# Patient Record
Sex: MALE | Race: WHITE | Employment: UNEMPLOYED | URBAN - METROPOLITAN AREA
[De-identification: names, ages, dates, MRNs, and addresses within clinical notes are randomized per-mention and may not be internally consistent; named-entity substitution may affect disease eponyms.]

---

## 2017-02-13 DIAGNOSIS — M06.9 RHEUMATOID ARTHRITIS INVOLVING MULTIPLE SITES, UNSPECIFIED RHEUMATOID FACTOR PRESENCE: ICD-10-CM

## 2017-02-13 RX ORDER — LEFLUNOMIDE 20 MG/1
20 TABLET ORAL DAILY
Qty: 30 TABLET | Refills: 0 | Status: SHIPPED | OUTPATIENT
Start: 2017-02-13 | End: 2017-03-15

## 2017-02-13 NOTE — TELEPHONE ENCOUNTER
LOV  4/26/16.   Injection appt.    Follow up guidelines not indicated at that visit in notes.    Harriet Giraldo RN  [facility]

## 2017-03-15 ENCOUNTER — OFFICE VISIT (OUTPATIENT)
Dept: RHEUMATOLOGY | Facility: CLINIC | Age: 57
End: 2017-03-15
Payer: MEDICARE

## 2017-03-15 VITALS
HEART RATE: 67 BPM | WEIGHT: 179 LBS | BODY MASS INDEX: 26.43 KG/M2 | SYSTOLIC BLOOD PRESSURE: 121 MMHG | RESPIRATION RATE: 18 BRPM | TEMPERATURE: 98.3 F | DIASTOLIC BLOOD PRESSURE: 86 MMHG

## 2017-03-15 DIAGNOSIS — M06.9 RHEUMATOID ARTHRITIS INVOLVING MULTIPLE SITES, UNSPECIFIED RHEUMATOID FACTOR PRESENCE: ICD-10-CM

## 2017-03-15 DIAGNOSIS — M06.09 RHEUMATOID ARTHRITIS OF MULTIPLE SITES WITHOUT RHEUMATOID FACTOR (H): ICD-10-CM

## 2017-03-15 PROCEDURE — 99214 OFFICE O/P EST MOD 30 MIN: CPT | Performed by: INTERNAL MEDICINE

## 2017-03-15 RX ORDER — PREDNISONE 5 MG/1
20 TABLET ORAL DAILY
Qty: 340 TABLET | Refills: 0 | Status: SHIPPED | OUTPATIENT
Start: 2017-03-15 | End: 2017-04-07

## 2017-03-15 RX ORDER — LEFLUNOMIDE 20 MG/1
20 TABLET ORAL DAILY
Qty: 30 TABLET | Refills: 0 | Status: SHIPPED | OUTPATIENT
Start: 2017-03-15 | End: 2017-04-07

## 2017-03-15 RX ORDER — OXYCODONE HYDROCHLORIDE 10 MG/1
10 TABLET ORAL EVERY 4 HOURS PRN
Qty: 90 TABLET | Refills: 0 | Status: SHIPPED | OUTPATIENT
Start: 2017-03-15 | End: 2017-04-24

## 2017-03-15 NOTE — MR AVS SNAPSHOT
"              After Visit Summary   3/15/2017    Waldemar Weaver    MRN: [id number]           Patient Information     Date Of Birth          1960        Visit Information        Provider Department      3/15/2017 3:30 PM Cosme Howard MD [facility]        Today's Diagnoses     Rheumatoid arthritis involving multiple sites, unspecified rheumatoid factor presence (H)        Rheumatoid arthritis of multiple sites without rheumatoid factor (H)           Follow-ups after your visit        Who to contact     If you have questions or need follow up information about today's clinic visit or your schedule please contact [facility] directly at [phone].  Normal or non-critical lab and imaging results will be communicated to you by [employer], letter or phone within 4 business days after the clinic has received the results. If you do not hear from us within 7 days, please contact the clinic through [employer] or phone. If you have a critical or abnormal lab result, we will notify you by phone as soon as possible.  Submit refill requests through [employer] or call your pharmacy and they will forward the refill request to us. Please allow 3 business days for your refill to be completed.          Additional Information About Your Visit        MyChart Information     [employer] lets you send messages to your doctor, view your test results, renew your prescriptions, schedule appointments and more. To sign up, go to [email or web address] . Click on \"Log in\" on the left side of the screen, which will take you to the Welcome page. Then click on \"Sign up Now\" on the right side of the page.     You will be asked to enter the access code listed below, as well as some personal information. Please follow the directions to create your username and password.     Your access code is: [email or web address]  Expires: 2017  7:34 AM     Your access code will  in 90 days. If you need help or a new code, please " call your [facility] clinic or [phone].        Care EveryWhere ID     This is your Care EveryWhere ID. This could be used by other organizations to access your [facility] medical records  [id number]        Your Vitals Were     Pulse Temperature Respirations BMI (Body Mass Index)          67 98.3  F (36.8  C) (Oral) 18 26.43 kg/m2         Blood Pressure from Last 3 Encounters:   03/15/17 121/86   10/10/16 114/70   06/24/16 136/71    Weight from Last 3 Encounters:   03/15/17 179 lb (81.2 kg)   04/26/16 172 lb (78 kg)   03/15/16 171 lb (77.6 kg)              Today, you had the following     No orders found for display         Today's Medication Changes          These changes are accurate as of: 3/15/17 11:59 PM.  If you have any questions, ask your nurse or doctor.               Start taking these medicines.        Dose/Directions    oxyCODONE 10 MG IR tablet   Commonly known as:  ROXICODONE   Used for:  Rheumatoid arthritis involving multiple sites, unspecified rheumatoid factor presence (H)   Started by:  Cosme Howard MD        Dose:  10 mg   Take 1 tablet (10 mg) by mouth every 4 hours as needed for moderate to severe pain   Quantity:  90 tablet   Refills:  0            Where to get your medicines      These medications were sent to [facility] [place], [place] - [address]  [address] [address]     Phone:  [phone]     leflunomide 20 MG tablet    predniSONE 5 MG tablet         Some of these will need a paper prescription and others can be bought over the counter.  Ask your nurse if you have questions.     Bring a paper prescription for each of these medications     oxyCODONE 10 MG IR tablet                Primary Care Provider Office Phone # Fax #    Felix Daniels [phone]       [place] [id number] [facility] [address]        Thank you!     Thank you for choosing [facility]  for your care. Our goal is always  to provide you with excellent care. Hearing back from our patients is one way we can continue to improve our services. Please take a few minutes to complete the written survey that you may receive in the mail after your visit with us. Thank you!             Your Updated Medication List - Protect others around you: Learn how to safely use, store and throw away your medicines at www.disposemymeds.org.          This list is accurate as of: 3/15/17 11:59 PM.  Always use your most recent med list.                   Brand Name Dispense Instructions for use    ADDERALL XR PO      Take 30 mg by mouth daily       EPIPEN 2-PAULINO 0.3 MG/0.3ML injection   Generic drug:  EPINEPHrine      Inject 0.3 mg into the muscle       leflunomide 20 MG tablet    ARAVA    30 tablet    Take 1 tablet (20 mg) by mouth daily MUST MAKE FOLLOW UP APPT BEFORE FURTHER REFILLS       omeprazole 20 MG tablet      Take 20 mg by mouth daily as needed       oxyCODONE 10 MG IR tablet    ROXICODONE    90 tablet    Take 1 tablet (10 mg) by mouth every 4 hours as needed for moderate to severe pain       predniSONE 5 MG tablet    DELTASONE    340 tablet    Take 4 tablets (20 mg) by mouth daily With continued attempts at taper to 17.5 mg daily       REMICADE 100 MG injection   Generic drug:  inFLIXimab      Inject into the vein every 6 months

## 2017-03-15 NOTE — NURSING NOTE
"Chief Complaint   Patient presents with     RECHECK     RA       Initial /86 (BP Location: Left arm, Patient Position: Chair, Cuff Size: Adult Regular)  Pulse 67  Temp 98.3  F (36.8  C) (Oral)  Resp 18  Wt 179 lb (81.2 kg)  BMI 26.43 kg/m2 Estimated body mass index is 26.43 kg/(m^2) as calculated from the following:    Height as of 11/4/15: 5' 9\" (1.753 m).    Weight as of this encounter: 179 lb (81.2 kg).  Medication Reconciliation: complete   Maira Banerjee LPN    "

## 2017-03-16 NOTE — PROGRESS NOTES
SUBJECTIVE:  Mr. Waldemar Weaver is seen back in follow-up for his seropositive rheumatoid arthritis.  He is going be having an EMG tomorrow and possible carpal tunnel surgery.  It has been nine months since his Rituxan.  He is still on leflunomide and prednisone 20 mg daily.  He has definitely, over the last several weeks, started to experience more stiffness, pain and swelling.  He had been doing fairly well up until that time.  Rituxan did go uneventfully.  He denies having any nausea, vomiting, or diarrhea from the leflunomide.  We did review that he had some very mild hepatic dysfunction on his last few blood tests that may be indicative of a problem with the leflunomide or something else.      PHYSICAL EXAM:   VITAL SIGNS:  Blood pressure 121/86, pulse 67, weight 179.   LUNGS:  Clear.   HEART:  Regular.   JOINTS:  There clearly is synovitis returning of both wrists; the right second, third and fourth PIP joints; and the left third and fourth PIP joints.   SKIN:  Without lesions.      IMPRESSION:   1.  Seropositive rheumatoid arthritis.   2.  Possible carpal tunnel syndrome.      RECOMMENDATIONS:   1.  Continue leflunomide 20 mg daily.   2.  Continue prednisone 20 mg daily, although I reinforced with him that over the long-term, we want to get his dose of prednisone lower.   3.  From my point of view, the Rituxan needs to be redosed.  I think the risk of infection or any type of complication from the Rituxan is very low, and from my point of view, even if he needed carpal tunnel surgery, he could still proceed with the Rituxan.  However, I certainly do not want prevent him from getting surgery if he needs it, so he needs to ask the surgeons what their thoughts are, and if acceptable, we will infuse him as soon as possible.  If not, unfortunately we will have to wait until after his surgery.   4.  He should have follow-up in 3 months so I can make certain we are getting down on the prednisone.   5.  We will also  need to check labs in the next 2-3 months.         RODRIGUEZ MAYFIELD MD             D: 03/15/2017 17:05   T: 2017 00:22   MT: LINDY#101      Name:     PAULA SOTO   MRN:      [id number]        Account:      [id number]   :      1960           Visit Date:   03/15/2017      Document: [id number]       cc: Felix Daniels MD

## 2017-03-29 ENCOUNTER — TELEPHONE (OUTPATIENT)
Dept: RHEUMATOLOGY | Facility: CLINIC | Age: 57
End: 2017-03-29

## 2017-03-29 RX ORDER — MEPERIDINE HYDROCHLORIDE 25 MG/ML
25 INJECTION INTRAMUSCULAR; INTRAVENOUS; SUBCUTANEOUS
Status: CANCELLED
Start: 2017-03-30

## 2017-03-29 RX ORDER — DIPHENHYDRAMINE HCL 50 MG
50 CAPSULE ORAL ONCE
Status: CANCELLED
Start: 2017-03-30 | End: 2017-03-30

## 2017-03-29 RX ORDER — ACETAMINOPHEN 325 MG/1
650 TABLET ORAL ONCE
Status: CANCELLED
Start: 2017-03-30 | End: 2017-03-30

## 2017-03-29 NOTE — TELEPHONE ENCOUNTER
Left message stating that Rituxin order is in.   Number for infulsion is [phone].  Maira Banerjee LPN

## 2017-03-29 NOTE — TELEPHONE ENCOUNTER
Reason for Call: Request for an order or referral:    Order or referral being requested: infusion    Date needed: as soon as possible    Has the patient been seen by the PCP for this problem? Not Applicable    Additional comments: pt wife calling stating pt was to call in if he was having a lot of pain and he could get an infusion order. Please place order and call wife once done     Phone number Patient can be reached at:  Other phone number:  [phone] - marily*    Best Time:  Any     Can we leave a detailed message on this number?  YES    Call taken on 3/29/2017 at 8:57 AM by Daniella Fall

## 2017-04-07 DIAGNOSIS — M06.9 RHEUMATOID ARTHRITIS INVOLVING MULTIPLE SITES, UNSPECIFIED RHEUMATOID FACTOR PRESENCE: ICD-10-CM

## 2017-04-07 DIAGNOSIS — M06.09 RHEUMATOID ARTHRITIS OF MULTIPLE SITES WITHOUT RHEUMATOID FACTOR (H): ICD-10-CM

## 2017-04-07 RX ORDER — PREDNISONE 5 MG/1
20 TABLET ORAL DAILY
Qty: 340 TABLET | Refills: 0 | Status: SHIPPED | OUTPATIENT
Start: 2017-04-07 | End: 2017-06-07

## 2017-04-07 RX ORDER — LEFLUNOMIDE 20 MG/1
20 TABLET ORAL DAILY
Qty: 30 TABLET | Refills: 0 | Status: SHIPPED | OUTPATIENT
Start: 2017-04-07 | End: 2017-05-09

## 2017-04-10 ENCOUNTER — INFUSION THERAPY VISIT (OUTPATIENT)
Dept: INFUSION THERAPY | Facility: CLINIC | Age: 57
End: 2017-04-10
Attending: INTERNAL MEDICINE
Payer: MEDICARE

## 2017-04-10 DIAGNOSIS — M06.9 RHEUMATOID ARTHRITIS INVOLVING MULTIPLE SITES, UNSPECIFIED RHEUMATOID FACTOR PRESENCE: Primary | ICD-10-CM

## 2017-04-10 PROCEDURE — 96413 CHEMO IV INFUSION 1 HR: CPT

## 2017-04-10 PROCEDURE — A9270 NON-COVERED ITEM OR SERVICE: HCPCS | Mod: GY | Performed by: INTERNAL MEDICINE

## 2017-04-10 PROCEDURE — 25000128 H RX IP 250 OP 636: Performed by: INTERNAL MEDICINE

## 2017-04-10 PROCEDURE — 96415 CHEMO IV INFUSION ADDL HR: CPT

## 2017-04-10 PROCEDURE — 25000132 ZZH RX MED GY IP 250 OP 250 PS 637: Mod: GY | Performed by: INTERNAL MEDICINE

## 2017-04-10 PROCEDURE — 96375 TX/PRO/DX INJ NEW DRUG ADDON: CPT

## 2017-04-10 RX ORDER — ACETAMINOPHEN 325 MG/1
650 TABLET ORAL ONCE
Status: CANCELLED
Start: 2017-04-10 | End: 2017-04-10

## 2017-04-10 RX ORDER — MEPERIDINE HYDROCHLORIDE 25 MG/ML
25 INJECTION INTRAMUSCULAR; INTRAVENOUS; SUBCUTANEOUS
Status: CANCELLED
Start: 2017-04-10

## 2017-04-10 RX ORDER — ACETAMINOPHEN 325 MG/1
650 TABLET ORAL ONCE
Status: COMPLETED | OUTPATIENT
Start: 2017-04-10 | End: 2017-04-10

## 2017-04-10 RX ORDER — DIPHENHYDRAMINE HCL 50 MG
50 CAPSULE ORAL ONCE
Status: CANCELLED
Start: 2017-04-10 | End: 2017-04-10

## 2017-04-10 RX ORDER — DIPHENHYDRAMINE HCL 50 MG
50 CAPSULE ORAL ONCE
Status: COMPLETED | OUTPATIENT
Start: 2017-04-10 | End: 2017-04-10

## 2017-04-10 RX ADMIN — RITUXIMAB 1000 MG: 10 INJECTION, SOLUTION INTRAVENOUS at 10:03

## 2017-04-10 RX ADMIN — ACETAMINOPHEN 650 MG: 325 TABLET, FILM COATED ORAL at 09:29

## 2017-04-10 RX ADMIN — DIPHENHYDRAMINE HYDROCHLORIDE 50 MG: 50 CAPSULE ORAL at 09:29

## 2017-04-10 RX ADMIN — SODIUM CHLORIDE 100 MG: 9 INJECTION, SOLUTION INTRAVENOUS at 09:39

## 2017-04-10 NOTE — MR AVS SNAPSHOT
"              After Visit Summary   4/10/2017    Waldemar Weaver    MRN: [id number]           Patient Information     Date Of Birth          1960        Visit Information        Provider Department      4/10/2017 8:30 AM ROOM 6 [facility]        Today's Diagnoses     Rheumatoid arthritis involving multiple sites, unspecified rheumatoid factor presence (H)    -  1       Follow-ups after your visit        Your next 10 appointments already scheduled     Apr 24, 2017  9:30 AM CDT   Level 5 with ROOM 3 [facility] ([facility] [place])    [facility]  [address]  [facility] [address]   [phone]              Who to contact     If you have questions or need follow up information about today's clinic visit or your schedule please contact [facility] directly at [phone].  Normal or non-critical lab and imaging results will be communicated to you by [employer], letter or phone within 4 business days after the clinic has received the results. If you do not hear from us within 7 days, please contact the clinic through [employer] or phone. If you have a critical or abnormal lab result, we will notify you by phone as soon as possible.  Submit refill requests through [employer] or call your pharmacy and they will forward the refill request to us. Please allow 3 business days for your refill to be completed.          Additional Information About Your Visit        MyChart Information     [employer] lets you send messages to your doctor, view your test results, renew your prescriptions, schedule appointments and more. To sign up, go to [email or web address] . Click on \"Log in\" on the left side of the screen, which will take you to the Welcome page. Then click on \"Sign up Now\" on the right side of the page.     You will be asked to enter the access code listed below, as well as some personal information. Please follow the directions to create your " username and password.     Your access code is: [email or web address]  Expires: 2017  7:34 AM     Your access code will  in 90 days. If you need help or a new code, please call your [facility] or [phone].        Care EveryWhere ID     This is your Care EveryWhere ID. This could be used by other organizations to access your [facility] medical records  [id number]        Your Vitals Were     Pulse Temperature BMI (Body Mass Index)             80 97.4  F (36.3  C) 26.58 kg/m2          Blood Pressure from Last 3 Encounters:   04/10/17 146/82   03/15/17 121/86   10/10/16 114/70    Weight from Last 3 Encounters:   04/10/17 81.6 kg (180 lb)   03/15/17 81.2 kg (179 lb)   16 78 kg (172 lb)              We Performed the Following     Nursing Communication 1     Nursing Communication 2        Primary Care Provider Office Phone # Fax #    Felix Daniels [phone]       [facility] [id number]        Thank you!     Thank you for choosing [facility]  for your care. Our goal is always to provide you with excellent care. Hearing back from our patients is one way we can continue to improve our services. Please take a few minutes to complete the written survey that you may receive in the mail after your visit with us. Thank you!             Your Updated Medication List - Protect others around you: Learn how to safely use, store and throw away your medicines at www.disposemymeds.org.          This list is accurate as of: 4/10/17 11:59 PM.  Always use your most recent med list.                   Brand Name Dispense Instructions for use    ADDERALL XR PO      Take 30 mg by mouth daily       EPIPEN 2-PAULINO 0.3 MG/0.3ML injection   Generic drug:  EPINEPHrine      Inject 0.3 mg into the muscle       leflunomide 20 MG tablet    ARAVA    30 tablet    Take 1 tablet (20 mg) by mouth daily       omeprazole 20 MG tablet      Take 20 mg by mouth daily as needed        oxyCODONE 10 MG IR tablet    ROXICODONE    90 tablet    Take 1 tablet (10 mg) by mouth every 4 hours as needed for moderate to severe pain       predniSONE 5 MG tablet    DELTASONE    340 tablet    Take 4 tablets (20 mg) by mouth daily With continued attempts at taper to 17.5 mg daily       REMICADE 100 MG injection   Generic drug:  inFLIXimab      Inject into the vein every 6 months

## 2017-04-10 NOTE — PROGRESS NOTES
Infusion Nursing Note:  Waldemar Weaver presents today for Rituxan.    Patient seen by provider today: No   present during visit today: Not Applicable.    Note: N/A.    Intravenous Access:  Peripheral IV placed.    Treatment Conditions:  Rheumatology Infusion Checklist: PRIOR TO INFUSION OF BIOLOGICAL MEDICATIONS OR ANY OF THESE AS LISTED: Remicade (infliximab)   Prior to Infusion of biological medications or any of these as listed:    1. Elevated temperature, fever, chills, productive cough or abnormal vital signs, night sweats, coughing up blood or sputum, no appetite or abnormal vital signs : NO    2. Open wounds or new incisions: NO    3. Recent hospitalization: NO    4.  Recent surgeries:  NO    5. Any upcoming surgeries or dental procedures?:NO    6. Any current or recent bouts of illness or infection? On any antibiotics? : NO    7. Any new, sudden or worsening abdominal pain :NO    8. Vaccination within 4 weeks? Patient or someone in the household is scheduled to receive vaccination? No live virus vaccines prior to or during treatment :NO    9. Any nervous system diseases [i.e. multiple sclerosis, Guillain-Walsh, seizures, neurological  changes]: NO    10. Pregnant or breast feeding; or plans on pregnancy in the future: NO    11. Signs of worsening depression or suicidal ideations while taking benlysta:NO    12. New-onset medical symptoms: NO    13.  New cancer diagnosis or on chemotherapy or radiation NO    14.  Evaluate for any sign of active TB [Unexplained weight loss, Loss of appetite, Night sweats, Fever, Fatigue, Chills, Coughing for 3 weeks or longer, Hemoptysis (coughing up blood), Chest pain]: NO    **Note: If answered yes to any of the above, hold the infusion and contact ordering rheumatologist or on-call rheumatologist.         Post Infusion Assessment:  Patient tolerated infusion without incident.  Access discontinued per protocol.    Discharge Plan:   Patient discharged in stable  condition accompanied by: self.  Next infusion scheduled for 4/24.    Misty Garrett RN

## 2017-04-11 VITALS
HEART RATE: 80 BPM | SYSTOLIC BLOOD PRESSURE: 146 MMHG | WEIGHT: 180 LBS | TEMPERATURE: 97.4 F | DIASTOLIC BLOOD PRESSURE: 82 MMHG | BODY MASS INDEX: 26.58 KG/M2

## 2017-04-24 ENCOUNTER — TELEPHONE (OUTPATIENT)
Dept: RHEUMATOLOGY | Facility: CLINIC | Age: 57
End: 2017-04-24

## 2017-04-24 DIAGNOSIS — M06.9 RHEUMATOID ARTHRITIS INVOLVING MULTIPLE SITES, UNSPECIFIED RHEUMATOID FACTOR PRESENCE: ICD-10-CM

## 2017-04-24 RX ORDER — OXYCODONE HYDROCHLORIDE 10 MG/1
10 TABLET ORAL EVERY 4 HOURS PRN
Qty: 90 TABLET | Refills: 0 | Status: SHIPPED | OUTPATIENT
Start: 2017-04-24 | End: 2017-06-02

## 2017-04-24 NOTE — PROGRESS NOTES
"Infusion Nursing Note:  Waldemar Weaver presents today for ***.    Patient seen by provider today: {YES (EXPLAIN)/NO:765971}   present during visit today: {UMHLANGUAGE:797730}    Note: {Not Applicable or free text:642134:s:\"N/A\"}.    Intravenous Access:  {UMHIVACCESS:088514}    Treatment Conditions:  {UMHTXCONDITIONS:040885}      Post Infusion Assessment:  {UMHPOSTINFUSION:321150}    Discharge Plan:   {UMHDISCHARGE:314122}    An Bay RN                        "

## 2017-04-25 NOTE — TELEPHONE ENCOUNTER
Per patient request rx taken to [facility] 4-25-17  Nazanin Geiger  Clinic Station

## 2017-05-03 ENCOUNTER — INFUSION THERAPY VISIT (OUTPATIENT)
Dept: INFUSION THERAPY | Facility: CLINIC | Age: 57
End: 2017-05-03
Attending: INTERNAL MEDICINE
Payer: MEDICARE

## 2017-05-03 VITALS — DIASTOLIC BLOOD PRESSURE: 75 MMHG | HEART RATE: 67 BPM | SYSTOLIC BLOOD PRESSURE: 127 MMHG | TEMPERATURE: 96.8 F

## 2017-05-03 DIAGNOSIS — M06.9 RHEUMATOID ARTHRITIS INVOLVING MULTIPLE SITES, UNSPECIFIED RHEUMATOID FACTOR PRESENCE: Primary | ICD-10-CM

## 2017-05-03 PROCEDURE — 96375 TX/PRO/DX INJ NEW DRUG ADDON: CPT

## 2017-05-03 PROCEDURE — A9270 NON-COVERED ITEM OR SERVICE: HCPCS | Mod: GY | Performed by: INTERNAL MEDICINE

## 2017-05-03 PROCEDURE — 96413 CHEMO IV INFUSION 1 HR: CPT

## 2017-05-03 PROCEDURE — 96415 CHEMO IV INFUSION ADDL HR: CPT

## 2017-05-03 PROCEDURE — 25000128 H RX IP 250 OP 636: Performed by: INTERNAL MEDICINE

## 2017-05-03 PROCEDURE — 25000132 ZZH RX MED GY IP 250 OP 250 PS 637: Mod: GY | Performed by: INTERNAL MEDICINE

## 2017-05-03 RX ORDER — ACETAMINOPHEN 325 MG/1
650 TABLET ORAL ONCE
Status: CANCELLED
Start: 2017-05-03 | End: 2017-05-03

## 2017-05-03 RX ORDER — MEPERIDINE HYDROCHLORIDE 25 MG/ML
25 INJECTION INTRAMUSCULAR; INTRAVENOUS; SUBCUTANEOUS
Status: DISCONTINUED | OUTPATIENT
Start: 2017-05-03 | End: 2017-05-03

## 2017-05-03 RX ORDER — MEPERIDINE HYDROCHLORIDE 25 MG/ML
25 INJECTION INTRAMUSCULAR; INTRAVENOUS; SUBCUTANEOUS
Status: CANCELLED
Start: 2017-05-03

## 2017-05-03 RX ORDER — DIPHENHYDRAMINE HCL 50 MG
50 CAPSULE ORAL ONCE
Status: COMPLETED | OUTPATIENT
Start: 2017-05-03 | End: 2017-05-03

## 2017-05-03 RX ORDER — DIPHENHYDRAMINE HCL 50 MG
50 CAPSULE ORAL ONCE
Status: CANCELLED
Start: 2017-05-03 | End: 2017-05-03

## 2017-05-03 RX ORDER — ACETAMINOPHEN 325 MG/1
650 TABLET ORAL ONCE
Status: COMPLETED | OUTPATIENT
Start: 2017-05-03 | End: 2017-05-03

## 2017-05-03 RX ADMIN — ACETAMINOPHEN 650 MG: 325 TABLET, FILM COATED ORAL at 09:38

## 2017-05-03 RX ADMIN — SODIUM CHLORIDE 250 ML: 9 INJECTION, SOLUTION INTRAVENOUS at 09:38

## 2017-05-03 RX ADMIN — SODIUM CHLORIDE 100 MG: 9 INJECTION, SOLUTION INTRAVENOUS at 09:38

## 2017-05-03 RX ADMIN — DIPHENHYDRAMINE HYDROCHLORIDE 50 MG: 50 CAPSULE ORAL at 09:39

## 2017-05-03 RX ADMIN — RITUXIMAB 1000 MG: 10 INJECTION, SOLUTION INTRAVENOUS at 09:51

## 2017-05-03 NOTE — PROGRESS NOTES
Infusion Nursing Note:  Waldemar Weaver presents today for IV Rituxan.      Patient seen by provider today: No   present during visit today: Not Applicable.    Note: Premeds and IV Rituxan given without complications. The Rituxan was increased in 100 ml. Increments every 30 minutes. Pt. To return in 6 months for another dose.    Intravenous Access:  No Intravenous access/labs at this visit.  Peripheral IV placed.    Treatment Conditions:  Not Applicable.      Post Infusion Assessment:  Patient tolerated infusion without incident.  Blood return noted pre and post infusion.  Site patent and intact, free from redness, edema or discomfort.  No evidence of extravasations.  Access discontinued per protocol.    Discharge Plan:   Patient and/or family verbalized understanding of discharge instructions and all questions answered.  Patient discharged in stable condition accompanied by: self.  Departure Mode: Ambulatory.    Shoshana Martinez RN

## 2017-05-03 NOTE — MR AVS SNAPSHOT
"              After Visit Summary   5/3/2017    Waldemar Weaver    MRN: [id number]           Patient Information     Date Of Birth          1960        Visit Information        Provider Department      5/3/2017 9:00 AM ROOM 5 [facility]        Today's Diagnoses     Rheumatoid arthritis involving multiple sites, unspecified rheumatoid factor presence (H)    -  1       Follow-ups after your visit        Who to contact     If you have questions or need follow up information about today's clinic visit or your schedule please contact [facility] directly at [phone].  Normal or non-critical lab and imaging results will be communicated to you by [employer], letter or phone within 4 business days after the clinic has received the results. If you do not hear from us within 7 days, please contact the clinic through [employer] or phone. If you have a critical or abnormal lab result, we will notify you by phone as soon as possible.  Submit refill requests through [employer] or call your pharmacy and they will forward the refill request to us. Please allow 3 business days for your refill to be completed.          Additional Information About Your Visit        MyChart Information     [employer] lets you send messages to your doctor, view your test results, renew your prescriptions, schedule appointments and more. To sign up, go to [email or web address] . Click on \"Log in\" on the left side of the screen, which will take you to the Welcome page. Then click on \"Sign up Now\" on the right side of the page.     You will be asked to enter the access code listed below, as well as some personal information. Please follow the directions to create your username and password.     Your access code is: [email or web address]  Expires: 2017  7:34 AM     Your access code will  in 90 days. If you need help or a new code, please call your [facility] or [phone].        Care EveryWhere ID     This is your Care " EveryWhere ID. This could be used by other organizations to access your [facility] medical records  [id number]        Your Vitals Were     Pulse Temperature                67 96.8  F (36  C) (Oral)           Blood Pressure from Last 3 Encounters:   05/03/17 127/75   04/10/17 146/82   03/15/17 121/86    Weight from Last 3 Encounters:   04/10/17 81.6 kg (180 lb)   03/15/17 81.2 kg (179 lb)   04/26/16 78 kg (172 lb)              Today, you had the following     No orders found for display       Primary Care Provider Office Phone # Fax #    Felix Daniels [phone]       [facility] [address] [address]        Thank you!     Thank you for choosing [facility]  for your care. Our goal is always to provide you with excellent care. Hearing back from our patients is one way we can continue to improve our services. Please take a few minutes to complete the written survey that you may receive in the mail after your visit with us. Thank you!             Your Updated Medication List - Protect others around you: Learn how to safely use, store and throw away your medicines at www.disposemymeds.org.          This list is accurate as of: 5/3/17  4:30 PM.  Always use your most recent med list.                   Brand Name Dispense Instructions for use    ADDERALL XR PO      Take 30 mg by mouth daily       EPIPEN 2-PAULINO 0.3 MG/0.3ML injection   Generic drug:  EPINEPHrine      Inject 0.3 mg into the muscle       leflunomide 20 MG tablet    ARAVA    30 tablet    Take 1 tablet (20 mg) by mouth daily       omeprazole 20 MG tablet      Take 20 mg by mouth daily as needed       oxyCODONE 10 MG IR tablet    ROXICODONE    90 tablet    Take 1 tablet (10 mg) by mouth every 4 hours as needed for moderate to severe pain       predniSONE 5 MG tablet    DELTASONE    340 tablet    Take 4 tablets (20 mg) by mouth daily With continued attempts at taper to 17.5 mg daily       REMICADE 100 MG  injection   Generic drug:  inFLIXimab      Inject into the vein every 6 months

## 2017-05-09 DIAGNOSIS — M06.9 RHEUMATOID ARTHRITIS INVOLVING MULTIPLE SITES, UNSPECIFIED RHEUMATOID FACTOR PRESENCE: ICD-10-CM

## 2017-05-09 RX ORDER — LEFLUNOMIDE 20 MG/1
20 TABLET ORAL DAILY
Qty: 30 TABLET | Refills: 0 | Status: SHIPPED | OUTPATIENT
Start: 2017-05-09 | End: 2017-06-07

## 2017-06-02 DIAGNOSIS — M06.9 RHEUMATOID ARTHRITIS INVOLVING MULTIPLE SITES, UNSPECIFIED RHEUMATOID FACTOR PRESENCE: ICD-10-CM

## 2017-06-02 NOTE — TELEPHONE ENCOUNTER
Provider not back in to [facility] to provide a Narcotic refill until Wed next week.  Left this on patient voicemail.    Harriet Giraldo RN  [facility]

## 2017-06-02 NOTE — TELEPHONE ENCOUNTER
Reason for Call:  Other     Detailed comments: Pt requesting refill of oxycodone - (pt would like it brought to the [facility])    Phone Number Patient can be reached at: Home number on file [phone] (home)    Best Time: Any    Can we leave a detailed message on this number? YES    Call taken on 6/2/2017 at 2:00 PM by Denise Behrendt

## 2017-06-07 DIAGNOSIS — M06.09 RHEUMATOID ARTHRITIS OF MULTIPLE SITES WITHOUT RHEUMATOID FACTOR (H): ICD-10-CM

## 2017-06-07 DIAGNOSIS — M06.9 RHEUMATOID ARTHRITIS INVOLVING MULTIPLE SITES, UNSPECIFIED RHEUMATOID FACTOR PRESENCE: ICD-10-CM

## 2017-06-07 RX ORDER — OXYCODONE HYDROCHLORIDE 10 MG/1
10 TABLET ORAL EVERY 4 HOURS PRN
Qty: 90 TABLET | Refills: 0 | Status: SHIPPED | OUTPATIENT
Start: 2017-06-07 | End: 2017-08-02

## 2017-06-07 RX ORDER — PREDNISONE 5 MG/1
20 TABLET ORAL DAILY
Qty: 340 TABLET | Refills: 0 | Status: SHIPPED | OUTPATIENT
Start: 2017-06-07 | End: 2017-08-28

## 2017-06-07 RX ORDER — LEFLUNOMIDE 20 MG/1
20 TABLET ORAL DAILY
Qty: 30 TABLET | Refills: 0 | Status: SHIPPED | OUTPATIENT
Start: 2017-06-07 | End: 2017-07-12

## 2017-06-07 NOTE — TELEPHONE ENCOUNTER
Prednisone        Last Written Prescription Date:  04-07-17  Last Fill Quantity: 340,   # refills: 0  Last Office Visit with [facility], [facility] or [facility] prescribing provider: 03-15-17  Future Office visit:       Routing refill request to provider for review/approval because:  Drug not on the [facility], [facility] or [facility] refill protocol or controlled substance.    Arava        Last Written Prescription Date:  05-09-17  Last Fill Quantity: 30,   # refills: 0  Last Office Visit with [facility], [facility] or [facility] prescribing provider: 03-15-17  Future Office visit:       Routing refill request to provider for review/approval because:  Drug not on the [facility], [facility] or [facility] refill protocol or controlled substance    Anna Leslie  [facility] RN

## 2017-07-12 DIAGNOSIS — M06.9 RHEUMATOID ARTHRITIS INVOLVING MULTIPLE SITES, UNSPECIFIED RHEUMATOID FACTOR PRESENCE: ICD-10-CM

## 2017-07-12 RX ORDER — LEFLUNOMIDE 20 MG/1
20 TABLET ORAL DAILY
Qty: 30 TABLET | Refills: 5 | Status: SHIPPED | OUTPATIENT
Start: 2017-07-12 | End: 2017-11-29

## 2017-07-12 NOTE — TELEPHONE ENCOUNTER
Please refill and sign labs. Letter sent to have labs done as requested at [facility].  Raquel OVIEDO RN BSN PHN  Specialty Clinics      [facility] 3/15/17  IMPRESSION:   1.  Seropositive rheumatoid arthritis.   2.  Possible carpal tunnel syndrome.       RECOMMENDATIONS:   1.  Continue leflunomide 20 mg daily.   2.  Continue prednisone 20 mg daily, although I reinforced with him that over the long-term, we want to get his dose of prednisone lower.   3.  From my point of view, the Rituxan needs to be redosed.  I think the risk of infection or any type of complication from the Rituxan is very low, and from my point of view, even if he needed carpal tunnel surgery, he could still proceed with the Rituxan.  However, I certainly do not want prevent him from getting surgery if he needs it, so he needs to ask the surgeons what their thoughts are, and if acceptable, we will infuse him as soon as possible.  If not, unfortunately we will have to wait until after his surgery.   4.  He should have follow-up in 3 months so I can make certain we are getting down on the prednisone.   5.  We will also need to check labs in the next 2-3 months.

## 2017-08-02 DIAGNOSIS — M06.9 RHEUMATOID ARTHRITIS INVOLVING MULTIPLE SITES, UNSPECIFIED RHEUMATOID FACTOR PRESENCE: ICD-10-CM

## 2017-08-02 NOTE — TELEPHONE ENCOUNTER
Patient is calling in for refill on his pain medication    Please call when rx has been taken to [facility] pharmacy

## 2017-08-02 NOTE — TELEPHONE ENCOUNTER
Oxycocone  Pt is due to have this refilled.  Prescription lasted 2 months.        Last Written Prescription Date:  06-07-17  Last Fill Quantity: 90,   # refills: 0  Last Office Visit with [facility], [facility] or [facility] prescribing provider: 03-15-17  Future Office visit:    Next 5 appointments (look out 90 days)     Aug 07, 2017  9:45 AM CDT   [facility]me Howard [facility]   [facility]    [address] [address]   [phone]                   Routing refill request to provider for review/approval because:  Drug not on the [facility], [facility] or [facility] refill protocol or controlled substance    Anna Leslie  [facility] RN

## 2017-08-02 NOTE — TELEPHONE ENCOUNTER
Oxycodone        Last Written Prescription Date:  06-07-17  Last Fill Quantity: 90,   # refills: 0  Last Office Visit with [facility] or [facility] prescribing provider: 03-15-17  Future Office visit:       Routing refill request to provider for review/approval because:  Drug not on the [facility] or [facility] refill protocol or controlled substance    Anna [facility] RN

## 2017-08-03 RX ORDER — OXYCODONE HYDROCHLORIDE 10 MG/1
10 TABLET ORAL EVERY 4 HOURS PRN
Qty: 90 TABLET | Refills: 0 | Status: SHIPPED | OUTPATIENT
Start: 2017-08-03 | End: 2017-11-17

## 2017-08-03 NOTE — TELEPHONE ENCOUNTER
Patient notified rx hand carried to [facility] pharmacy [phone]  Nazanin Prateron  [facility] [place]

## 2017-08-28 DIAGNOSIS — M06.09 RHEUMATOID ARTHRITIS OF MULTIPLE SITES WITHOUT RHEUMATOID FACTOR (H): ICD-10-CM

## 2017-08-28 RX ORDER — PREDNISONE 5 MG/1
20 TABLET ORAL DAILY
Qty: 340 TABLET | Refills: 0 | Status: SHIPPED | OUTPATIENT
Start: 2017-08-28 | End: 2017-12-29

## 2017-08-28 NOTE — TELEPHONE ENCOUNTER
Prednisone        Last Written Prescription Date:  06-07-17  Last Fill Quantity: 340,   # refills: 0  Last Office Visit with [facility] or [facility] prescribing provider: 03-15-17  Future Office visit:       Routing refill request to provider for review/approval because:  Drug not on the [facility], [facility] or [facility] refill protocol or controlled substance    Anna [facility] RN

## 2017-08-28 NOTE — TELEPHONE ENCOUNTER
Called pt and advised that he is due for an office visit.  Pt will call back once he gets home from work to review his calendar.  He will call back to schedule an office visit.    Anna Leslie  [facility] RN

## 2017-10-03 ENCOUNTER — TELEPHONE (OUTPATIENT)
Dept: RHEUMATOLOGY | Facility: CLINIC | Age: 57
End: 2017-10-03

## 2017-10-03 NOTE — TELEPHONE ENCOUNTER
Pt calling to get a refill on pain medication.    Please call-     Maira Woods  [facility] [place]

## 2017-10-03 NOTE — TELEPHONE ENCOUNTER
I spoke to Waldemar's family member. Waldemar was driving and asked her to take the message. I let him know that he should call the Rx into the pharmacy so that we can forward it to the provider. He does have an appointment scheduled next week. Ania CARLSON RN

## 2017-11-17 DIAGNOSIS — M06.9 RHEUMATOID ARTHRITIS INVOLVING MULTIPLE SITES, UNSPECIFIED RHEUMATOID FACTOR PRESENCE: ICD-10-CM

## 2017-11-20 RX ORDER — OXYCODONE HYDROCHLORIDE 10 MG/1
10 TABLET ORAL EVERY 4 HOURS PRN
Qty: 90 TABLET | Refills: 0 | Status: SHIPPED | OUTPATIENT
Start: 2017-11-20 | End: 2021-05-29

## 2017-11-20 NOTE — TELEPHONE ENCOUNTER
Pt notified rx brought to [facility].     Denise Behrendt Specialty CSS

## 2017-11-29 ENCOUNTER — OFFICE VISIT (OUTPATIENT)
Dept: RHEUMATOLOGY | Facility: CLINIC | Age: 57
End: 2017-11-29
Payer: MEDICARE

## 2017-11-29 VITALS
SYSTOLIC BLOOD PRESSURE: 150 MMHG | BODY MASS INDEX: 27.88 KG/M2 | RESPIRATION RATE: 16 BRPM | WEIGHT: 188.8 LBS | HEART RATE: 75 BPM | DIASTOLIC BLOOD PRESSURE: 88 MMHG

## 2017-11-29 DIAGNOSIS — M81.8 STEROID-INDUCED OSTEOPOROSIS: ICD-10-CM

## 2017-11-29 DIAGNOSIS — M06.9 RHEUMATOID ARTHRITIS INVOLVING MULTIPLE SITES, UNSPECIFIED RHEUMATOID FACTOR PRESENCE: Primary | ICD-10-CM

## 2017-11-29 DIAGNOSIS — T38.0X5A STEROID-INDUCED OSTEOPOROSIS: ICD-10-CM

## 2017-11-29 LAB
ALBUMIN SERPL-MCNC: 3.6 G/DL (ref 3.4–5)
ALP SERPL-CCNC: 140 U/L (ref 40–150)
ALT SERPL W P-5'-P-CCNC: 85 U/L (ref 0–70)
ANION GAP SERPL CALCULATED.3IONS-SCNC: 6 MMOL/L (ref 3–14)
AST SERPL W P-5'-P-CCNC: 43 U/L (ref 0–45)
BASOPHILS # BLD AUTO: 0 10E9/L (ref 0–0.2)
BASOPHILS NFR BLD AUTO: 0.4 %
BILIRUB SERPL-MCNC: 0.3 MG/DL (ref 0.2–1.3)
BUN SERPL-MCNC: 12 MG/DL (ref 7–30)
CALCIUM SERPL-MCNC: 8.3 MG/DL (ref 8.5–10.1)
CHLORIDE SERPL-SCNC: 107 MMOL/L (ref 94–109)
CO2 SERPL-SCNC: 28 MMOL/L (ref 20–32)
CREAT SERPL-MCNC: 0.86 MG/DL (ref 0.66–1.25)
DIFFERENTIAL METHOD BLD: NORMAL
EOSINOPHIL # BLD AUTO: 0.1 10E9/L (ref 0–0.7)
EOSINOPHIL NFR BLD AUTO: 1.3 %
ERYTHROCYTE [DISTWIDTH] IN BLOOD BY AUTOMATED COUNT: 13.8 % (ref 10–15)
GFR SERPL CREATININE-BSD FRML MDRD: >90 ML/MIN/1.7M2
GLUCOSE SERPL-MCNC: 87 MG/DL (ref 70–99)
HCT VFR BLD AUTO: 43.2 % (ref 40–53)
HGB BLD-MCNC: 14 G/DL (ref 13.3–17.7)
LYMPHOCYTES # BLD AUTO: 1.9 10E9/L (ref 0.8–5.3)
LYMPHOCYTES NFR BLD AUTO: 28.4 %
MCH RBC QN AUTO: 31.5 PG (ref 26.5–33)
MCHC RBC AUTO-ENTMCNC: 32.4 G/DL (ref 31.5–36.5)
MCV RBC AUTO: 97 FL (ref 78–100)
MONOCYTES # BLD AUTO: 0.8 10E9/L (ref 0–1.3)
MONOCYTES NFR BLD AUTO: 11.9 %
NEUTROPHILS # BLD AUTO: 3.9 10E9/L (ref 1.6–8.3)
NEUTROPHILS NFR BLD AUTO: 58 %
PLATELET # BLD AUTO: 197 10E9/L (ref 150–450)
POTASSIUM SERPL-SCNC: 3.3 MMOL/L (ref 3.4–5.3)
PROT SERPL-MCNC: 6.7 G/DL (ref 6.8–8.8)
RBC # BLD AUTO: 4.44 10E12/L (ref 4.4–5.9)
SODIUM SERPL-SCNC: 141 MMOL/L (ref 133–144)
WBC # BLD AUTO: 6.8 10E9/L (ref 4–11)

## 2017-11-29 PROCEDURE — 85025 COMPLETE CBC W/AUTO DIFF WBC: CPT | Performed by: INTERNAL MEDICINE

## 2017-11-29 PROCEDURE — 36415 COLL VENOUS BLD VENIPUNCTURE: CPT | Performed by: INTERNAL MEDICINE

## 2017-11-29 PROCEDURE — 99214 OFFICE O/P EST MOD 30 MIN: CPT | Performed by: INTERNAL MEDICINE

## 2017-11-29 PROCEDURE — 80053 COMPREHEN METABOLIC PANEL: CPT | Performed by: INTERNAL MEDICINE

## 2017-11-29 RX ORDER — LEFLUNOMIDE 20 MG/1
20 TABLET ORAL DAILY
Qty: 90 TABLET | Refills: 3 | Status: SHIPPED | OUTPATIENT
Start: 2017-11-29

## 2017-11-29 NOTE — MR AVS SNAPSHOT
"              After Visit Summary   2017    Waldemar Weaver    MRN: [id number]           Patient Information     Date Of Birth          1960        Visit Information        Provider Department      2017 8:45 AM Cosme Howard MD [facility]        Today's Diagnoses     Rheumatoid arthritis involving multiple sites, unspecified rheumatoid factor presence (H)    -  1    Steroid-induced osteoporosis           Follow-ups after your visit        Who to contact     If you have questions or need follow up information about today's clinic visit or your schedule please contact [facility] directly at [phone].  Normal or non-critical lab and imaging results will be communicated to you by MyChart, letter or phone within 4 business days after the clinic has received the results. If you do not hear from us within 7 days, please contact the clinic through [employer] or phone. If you have a critical or abnormal lab result, we will notify you by phone as soon as possible.  Submit refill requests through [employer] or call your pharmacy and they will forward the refill request to us. Please allow 3 business days for your refill to be completed.          Additional Information About Your Visit        MyChart Information     [employer] lets you send messages to your doctor, view your test results, renew your prescriptions, schedule appointments and more. To sign up, go to [email or web address] . Click on \"Log in\" on the left side of the screen, which will take you to the Welcome page. Then click on \"Sign up Now\" on the right side of the page.     You will be asked to enter the access code listed below, as well as some personal information. Please follow the directions to create your username and password.     Your access code is: [email or web address]  Expires: 3/4/2018  9:17 AM     Your access code will  in 90 days. If you need help or a new code, please call your [facility] or " [phone].        Care EveryWhere ID     This is your Care EveryWhere ID. This could be used by other organizations to access your [facility] medical records  [id number]        Your Vitals Were     Pulse Respirations BMI (Body Mass Index)             75 16 27.88 kg/m2          Blood Pressure from Last 3 Encounters:   11/29/17 150/88   05/03/17 127/75   04/10/17 146/82    Weight from Last 3 Encounters:   11/29/17 188 lb 12.8 oz (85.6 kg)   04/10/17 180 lb (81.6 kg)   03/15/17 179 lb (81.2 kg)              We Performed the Following     CBC with platelets differential     Comprehensive metabolic panel          Where to get your medicines      These medications were sent to [facility] [address]  [address] [address]     Phone:  [phone]     leflunomide 20 MG tablet          Primary Care Provider Office Phone # Fax #    Felix Daniels [phone]       [place] [address] [address]        Equal Access to Services     OLGA DURON : Hadii nestor ku hadasho Sotiffanyali, waaxda luqadaha, qaybta kaalmada adekavinyaemmanuel, luis daniel baxter . So [facility] [phone].    ATENCIÓN: Si habla español, tiene a zapien disposición servicios gratuitos de asistencia lingüística. Ll[facility] [phone].    We comply with applicable federal civil rights laws and [place] laws. We do not discriminate on the basis of race, color, national origin, age, disability, sex, sexual orientation, or gender identity.            Thank you!     Thank you for choosing [facility]  for your care. Our goal is always to provide you with excellent care. Hearing back from our patients is one way we can continue to improve our services. Please take a few minutes to complete the written survey that you may receive in the mail after your visit with us. Thank you!             Your Updated Medication List - Protect others around you:  Learn how to safely use, store and throw away your medicines at www.disposemymeds.org.          This list is accurate as of: 11/29/17 11:59 PM.  Always use your most recent med list.                   Brand Name Dispense Instructions for use Diagnosis    ADDERALL XR PO      Take 30 mg by mouth daily        EPIPEN 2-PAULINO 0.3 MG/0.3ML injection 2-pack   Generic drug:  EPINEPHrine      Inject 0.3 mg into the muscle        leflunomide 20 MG tablet    ARAVA    90 tablet    Take 1 tablet (20 mg) by mouth daily    Rheumatoid arthritis involving multiple sites, unspecified rheumatoid factor presence (H)       omeprazole 20 MG tablet      Take 20 mg by mouth daily as needed        oxyCODONE IR 10 MG tablet    ROXICODONE    90 tablet    Take 1 tablet (10 mg) by mouth every 4 hours as needed for moderate to severe pain    Rheumatoid arthritis involving multiple sites, unspecified rheumatoid factor presence (H)       predniSONE 5 MG tablet    DELTASONE    340 tablet    Take 4 tablets (20 mg) by mouth daily With continued attempts at taper to 17.5 mg daily    Rheumatoid arthritis of multiple sites without rheumatoid factor (H)       REMICADE 100 MG injection   Generic drug:  inFLIXimab      Inject into the vein every 6 months

## 2017-11-29 NOTE — NURSING NOTE
"Chief Complaint   Patient presents with     RECHECK     Medication recheck       Initial There were no vitals taken for this visit. Estimated body mass index is 26.58 kg/(m^2) as calculated from the following:    Height as of 11/4/15: 5' 9\" (1.753 m).    Weight as of 4/10/17: 180 lb (81.6 kg).  Medication Reconciliation: complete    "

## 2017-11-29 NOTE — PROGRESS NOTES
SUBJECTIVE:  Mr. Waldemar Soto is seen back in followup for his inflammatory arthritis.  He is still on 20 mg a day of prednisone as well as the leflunomide 20 mg daily.  He is also taking Oxy IR for pain control.  He did receive the Rituxan dosing back in May, and he does think that it is helpful.  For reasons that are not clear to me, he has once again failed to taper down on his prednisone, and we discussed how this is eventually going to become problematic.  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  He is noticing some pain and swelling in his wrist and several of his fingers, but he had been doing well prior to the clem job.  The Rituxan infusions went uneventfully, and he denied having any notable side effects.  He denies any side effects from the leflunomide such as nausea, vomiting, diarrhea, rash, etc.      PHYSICAL EXAMINATION:   VITAL SIGNS:  Blood pressure 150/88, pulse 75, weight 188.   NECK:  Supple, without lymphadenopathy.   LUNGS:  Clear.   HEART:  Regular.   JOINT EXAM:  There is mild to moderate synovitis of the right wrist.  There is mild synovitis of the right 2nd MCP joint, the left 2nd MCP joint, the right 3rd PIP joint.   SKIN:  Without lesions.      IMPRESSION:  Seropositive rheumatoid arthritis.      RECOMMENDATIONS:   1.  Continue leflunomide 20 mg daily.   2.  Consider redosing Rituxan in 3-6 months.   3.  He needs to cut back on the prednisone.  I asked him to go down to 15 mg a day and let me know if there is any worsening of symptoms.   4.  He should have a DEXA scan, given the long-term steroid usage   5.  He will need followup, which he says he will arrange at [place].  He will let me know if he needs any assistance from me.         RODRIGUEZ MAYFIELD MD             D: 2017 12:17   T: 2017 17:57   MT: DUDLEY      Name:     WALDEMAR SOTO   MRN:      [id number]        Account:      [id number]   :      1960            Visit Date:   11/29/2017      Document: [id number]

## 2017-11-29 NOTE — LETTER
[facility]  [address] [address]  [phone]          November 29, 2017    Waldemar Weaver                                                                                                                     [address] [place] [address]        Dear Dr. Anita Medeiros has reviewed the results of your labs of 11/29/17, and has made the following observations:      Labs are normal    Please don't hesitate to contact our office with any additional questions or concerns.             Sincerely,     PAPITO ValleA  For Cosme Howard MD

## 2017-12-29 DIAGNOSIS — M06.09 RHEUMATOID ARTHRITIS OF MULTIPLE SITES WITHOUT RHEUMATOID FACTOR (H): ICD-10-CM

## 2017-12-29 RX ORDER — PREDNISONE 5 MG/1
20 TABLET ORAL DAILY
Qty: 340 TABLET | Refills: 0 | Status: SHIPPED | OUTPATIENT
Start: 2017-12-29

## 2017-12-29 NOTE — TELEPHONE ENCOUNTER
Prednisone        Last Written Prescription Date:  08-28-17  Last Fill Quantity: 340,   # refills: 0  Last Office Visit: 11-29-17  Future Office visit:       Routing refill request to provider for review/approval because:  Drug not on the [facility], [facility] or [facility] refill protocol or controlled substance    Anna [facility] RN

## 2018-01-02 ENCOUNTER — HOSPITAL ENCOUNTER (OUTPATIENT)
Dept: BONE DENSITY | Facility: CLINIC | Age: 58
Discharge: HOME | End: 2018-01-02
Attending: INTERNAL MEDICINE | Admitting: INTERNAL MEDICINE
Payer: MEDICARE

## 2018-01-02 DIAGNOSIS — T38.0X5A STEROID-INDUCED OSTEOPOROSIS: ICD-10-CM

## 2018-01-02 DIAGNOSIS — M81.8 STEROID-INDUCED OSTEOPOROSIS: ICD-10-CM

## 2018-01-02 PROCEDURE — 77080 DXA BONE DENSITY AXIAL: CPT

## 2018-01-15 ENCOUNTER — HOSPITAL ENCOUNTER (EMERGENCY)
Facility: CLINIC | Age: 58
Discharge: HOME | End: 2018-01-16
Attending: FAMILY MEDICINE | Admitting: FAMILY MEDICINE
Payer: MEDICARE

## 2018-01-15 ENCOUNTER — APPOINTMENT (OUTPATIENT)
Dept: GENERAL RADIOLOGY | Facility: CLINIC | Age: 58
End: 2018-01-15
Attending: FAMILY MEDICINE
Payer: MEDICARE

## 2018-01-15 ENCOUNTER — APPOINTMENT (OUTPATIENT)
Dept: MRI IMAGING | Facility: CLINIC | Age: 58
End: 2018-01-15
Attending: FAMILY MEDICINE
Payer: MEDICARE

## 2018-01-15 VITALS
SYSTOLIC BLOOD PRESSURE: 117 MMHG | TEMPERATURE: 99.3 F | OXYGEN SATURATION: 96 % | RESPIRATION RATE: 16 BRPM | HEART RATE: 67 BPM | DIASTOLIC BLOOD PRESSURE: 81 MMHG

## 2018-01-15 DIAGNOSIS — M06.9 RHEUMATOID ARTHRITIS INVOLVING MULTIPLE SITES, UNSPECIFIED RHEUMATOID FACTOR PRESENCE: ICD-10-CM

## 2018-01-15 DIAGNOSIS — Z01.89 ENCOUNTER FOR IMAGING TO SCREEN FOR METAL PRIOR TO MAGNETIC RESONANCE IMAGING (MRI): ICD-10-CM

## 2018-01-15 DIAGNOSIS — M79.651 PAIN OF RIGHT THIGH: ICD-10-CM

## 2018-01-15 LAB
ALBUMIN UR-MCNC: NEGATIVE MG/DL
APPEARANCE UR: CLEAR
BILIRUB UR QL STRIP: NEGATIVE
COLOR UR AUTO: YELLOW
GLUCOSE UR STRIP-MCNC: NEGATIVE MG/DL
HGB UR QL STRIP: NEGATIVE
KETONES UR STRIP-MCNC: NEGATIVE MG/DL
LEUKOCYTE ESTERASE UR QL STRIP: NEGATIVE
MUCOUS THREADS #/AREA URNS LPF: PRESENT /LPF
NITRATE UR QL: NEGATIVE
PH UR STRIP: 5.5 PH (ref 5–7)
RBC #/AREA URNS AUTO: 1 /HPF (ref 0–2)
SOURCE: ABNORMAL
SP GR UR STRIP: 1.01 (ref 1–1.03)
UROBILINOGEN UR STRIP-MCNC: NORMAL MG/DL (ref 0–2)
WBC #/AREA URNS AUTO: 0 /HPF (ref 0–2)

## 2018-01-15 PROCEDURE — 81001 URINALYSIS AUTO W/SCOPE: CPT | Performed by: FAMILY MEDICINE

## 2018-01-15 PROCEDURE — 99285 EMERGENCY DEPT VISIT HI MDM: CPT | Mod: 25 | Performed by: FAMILY MEDICINE

## 2018-01-15 PROCEDURE — 25000132 ZZH RX MED GY IP 250 OP 250 PS 637: Mod: GY | Performed by: FAMILY MEDICINE

## 2018-01-15 PROCEDURE — 70030 X-RAY EYE FOR FOREIGN BODY: CPT

## 2018-01-15 PROCEDURE — 73718 MRI LOWER EXTREMITY W/O DYE: CPT | Mod: RT

## 2018-01-15 PROCEDURE — 73552 X-RAY EXAM OF FEMUR 2/>: CPT | Mod: RT

## 2018-01-15 PROCEDURE — 99284 EMERGENCY DEPT VISIT MOD MDM: CPT | Mod: Z6 | Performed by: FAMILY MEDICINE

## 2018-01-15 PROCEDURE — 72170 X-RAY EXAM OF PELVIS: CPT

## 2018-01-15 PROCEDURE — A9270 NON-COVERED ITEM OR SERVICE: HCPCS | Mod: GY | Performed by: FAMILY MEDICINE

## 2018-01-15 RX ORDER — OXYCODONE AND ACETAMINOPHEN 5; 325 MG/1; MG/1
2 TABLET ORAL ONCE
Status: COMPLETED | OUTPATIENT
Start: 2018-01-15 | End: 2018-01-15

## 2018-01-15 RX ADMIN — OXYCODONE HYDROCHLORIDE AND ACETAMINOPHEN 2 TABLET: 5; 325 TABLET ORAL at 21:27

## 2018-01-15 NOTE — ED AVS SNAPSHOT
[facility] Emergency Department    [address] [address]    Phone:  [phone]    Fax:  [phone]                                       Waldemar Weaver   MRN: [id number]    Department:  [facility] Emergency Department   Date of Visit:  1/15/2018           After Visit Summary Signature Page     I have received my discharge instructions, and my questions have been answered. I have discussed any challenges I see with this plan with the nurse or doctor.    ..........................................................................................................................................  Patient/Patient Representative Signature      ..........................................................................................................................................  Patient Representative Print Name and Relationship to Patient    ..................................................               ................................................  Date                                            Time    ..........................................................................................................................................  Reviewed by Signature/Title    ...................................................              ..............................................  Date                                                            Time

## 2018-01-15 NOTE — ED AVS SNAPSHOT
[facility]    [address] [address]    Phone:  [phone]    Fax:  [phone]                                       Waldemar Weaver   MRN: [id number]    Department:  [facility]   Date of Visit:  1/15/2018           Patient Information     Date Of Birth          1960        Your diagnoses for this visit were:     Encounter for imaging to screen for metal prior to magnetic resonance imaging (MRI)     Rheumatoid arthritis involving multiple sites, unspecified rheumatoid factor presence (H)     Pain of right thigh unclear cause. despite recent injury, no sign of fracture on xray or MRI.  Use crutches, stay off the leg, avoid overuse.  Stay on RA meds including prednisone and leflunemide.  There was a small hip effusion.  At times this needs to undergo arthrocentesis if signs of infection - more likely currently related to RA.  close follow-up is needed.       You were seen by Jorge Otto MD.      Follow-up Information     Follow up with Felix Daniels MD In 1 week.    Specialty:  Family Practice    Contact information:    [place] [id number] [facility] [id number] [phone]          Follow up with [facility].    Specialty:  EMERGENCY MEDICINE    Why:  As needed, If symptoms worsen    Contact information:    [address] [place] [id number] [phone]    Additional information:    The medical center is located at   [address] (between [address] and   [address] in [place], [place] north   of [place]).        Discharge Instructions         ICD-10-CM    1. Encounter for imaging to screen for metal prior to magnetic resonance imaging (MRI) Z13.89 UA with Microscopic     XR Eye Foreign Body     XR Eye Foreign Body   2. Rheumatoid arthritis involving multiple sites, unspecified rheumatoid factor presence (H) M06.9 RHEUMATOLOGY REFERRAL   3. Pain of right thigh M79.651  ORTHO  REFERRAL    unclear cause. despite recent injury, no sign of fracture on xray or MRI.  Use crutches, stay off the leg, avoid overuse.  Stay on RA meds including prednisone and leflunemide.  There was a small hip effusion.  At times this needs to undergo arthrocentesis if signs of infection - more likely currently related to RA.  close follow-up is needed.         Discharge References/Attachments     BACK PAIN (LOW) OR LEG PAIN: POSSIBLE CAUSES (ENGLISH)      24 Hour Appointment Hotline       To make an appointment at any [facility] clinic, call [phone]). If you don't have a family doctor or clinic, we will help you find one. [facility] clinics are conveniently located to serve the needs of you and your family.          ED Discharge Orders     Alum Crutches: Adult       Use gait belt during crutch training.            ORTHO  REFERRAL       [facility] is referring you to the Orthopedic  Services at [facility].       The  Representative will assist you in the coordination of your Orthopedic and Musculoskeletal Care as prescribed by your physician.    The  Representative will call you within 1 business day to help schedule your appointment, or you may contact the  Representative at:    All areas ~ [phone]     Type of Referral : Non Surgical       Timeframe requested: 3 - 5 days    Coverage of these services is subject to the terms and limitations of your health insurance plan.  Please call member services at your health plan with any benefit or coverage questions.      If X-rays, CT or MRI's have been performed, please contact the facility where they were done to arrange for , prior to your scheduled appointment.  Please bring this referral request to your appointment and present it to your specialist.            RHEUMATOLOGY REFERRAL       Your provider has referred you to: HOWARD: [facility]  [facility] [phone]   [email or web address]  [facility]: [facility] [phone]   [email or web address]  [facility]: [facility] [phone]   [email or web address]/  UMP: [facility] [phone]   [email or web address]  FHN: [facility], YAKOV- [place] [phone] Fax [phone] [email or web address]  Manoj Chávez MD, CARYN Guallpa [phone]   [email or web address]      Please be aware that coverage of these services is subject to the terms and limitations of your health insurance plan.  Call member services at your health plan with any benefit or coverage questions.      Please bring the following with you to your appointment:    (1) Any X-Rays, CTs or MRIs which have been performed.  Contact the facility where they were done to arrange for  prior to your scheduled appointment.    (2) List of current medications   (3) This referral request   (4) Any documents/labs given to you for this referral                     Review of your medicines      START taking        Dose / Directions Last dose taken    sulindac 200 MG tablet   Commonly known as:  CLINORIL   Dose:  200 mg   Quantity:  20 tablet        Take 1 tablet (200 mg) by mouth 2 times daily (with meals) do not take with advil   Refills:  0          Our records show that you are taking the medicines listed below. If these are incorrect, please call your family doctor or clinic.        Dose / Directions Last dose taken    ADDERALL XR PO   Dose:  30 mg        Take 30 mg by mouth daily   Refills:  0        EPIPEN 2-PAULINO 0.3 MG/0.3ML injection 2-pack   Dose:  0.3 mg   Generic drug:  EPINEPHrine        Inject 0.3 mg into the muscle   Refills:  0        leflunomide 20 MG tablet    Commonly known as:  ARAVA   Dose:  20 mg   Quantity:  90 tablet        Take 1 tablet (20 mg) by mouth daily   Refills:  3        omeprazole 20 MG tablet   Dose:  20 mg        Take 20 mg by mouth daily as needed   Refills:  0        oxyCODONE IR 10 MG tablet   Commonly known as:  ROXICODONE   Dose:  10 mg   Quantity:  90 tablet        Take 1 tablet (10 mg) by mouth every 4 hours as needed for moderate to severe pain   Refills:  0        predniSONE 5 MG tablet   Commonly known as:  DELTASONE   Dose:  20 mg   Quantity:  340 tablet        Take 4 tablets (20 mg) by mouth daily With continued attempts at taper to 17.5 mg daily   Refills:  0        REMICADE 100 MG injection   Generic drug:  inFLIXimab        Inject into the vein every 6 months   Refills:  0                Prescriptions were sent or printed at these locations (1 Prescription)                   [facility] [place] - [id number]   [address], [place] [address]    Telephone:  [phone]   Fax:  [phone]   Hours:  ALIYAH Perales                E-Prescribed (1 of 1)         sulindac (CLINORIL) 200 MG tablet                Procedures and tests performed during your visit     Femur XR, 2 views, right    MR Femur Right w/o Contrast    Pelvis XR, 1-2 views    UA with Microscopic    XR Eye Foreign Body      Orders Needing Specimen Collection     None      Pending Results     Date and Time Order Name Status Description    1/15/2018 2116 MR Femur Right w/o Contrast In process             Pending Culture Results     No orders found for last 3 day(s).            Pending Results Instructions     If you had any lab results that were not finalized at the time of your Discharge, you can call the ED Lab Result RN at [phone]. You will be contacted by this team for any positive Lab results or changes in treatment. The nurses are available 7 days a week from 10A to 6:30P.  You can leave a message 24 hours per  day and they will return your call.        Test Results From Your Hospital Stay        1/15/2018  9:48 PM      Narrative     RIGHT FEMUR TWO VIEWS  1/15/2018 9:14 PM     COMPARISON: None.    HISTORY: Lateral femur pain after fall.      FINDINGS: The visualized bones and joint spaces are within normal  limits.        Impression     IMPRESSION: No evidence for fracture, dislocation or significant  degenerative change of the right femur.     YANIRA POSEY MD         1/15/2018  9:48 PM      Narrative     PELVIS ONE-TWO VIEWS 1/15/2018 9:14 PM     COMPARISON: None.    HISTORY: Fall, hip pain, right.    FINDINGS: The visualized bones and joint spaces are within normal  limits.        Impression     IMPRESSION: No evidence for fracture, dislocation or significant  degenerative change of the pelvis or right hip on this single frontal  view.    YANIRA POSEY MD         1/15/2018  9:30 PM      Component Results     Component Value Ref Range & Units Status    Color Urine Yellow  Final    Appearance Urine Clear  Final    Glucose Urine Negative NEG^Negative mg/dL Final    Bilirubin Urine Negative NEG^Negative Final    Ketones Urine Negative NEG^Negative mg/dL Final    Specific Gravity Urine 1.015 1.003 - 1.035 Final    Blood Urine Negative NEG^Negative Final    pH Urine 5.5 5.0 - 7.0 pH Final    Protein Albumin Urine Negative NEG^Negative mg/dL Final    Urobilinogen mg/dL Normal 0.0 - 2.0 mg/dL Final    Nitrite Urine Negative NEG^Negative Final    Leukocyte Esterase Urine Negative NEG^Negative Final    Source Midstream Urine  Final    WBC Urine 0 0 - 2 /HPF Final    RBC Urine 1 0 - 2 /HPF Final    Mucous Urine Present (A) NEG^Negative /LPF Final         1/15/2018 10:03 PM      Result not yet available     Exam Ended         1/15/2018  9:48 PM      Narrative     EYE FOREIGN BODY  1/15/2018  9:41 PM     COMPARISON: None.    HISTORY: Encounter for imaging to screen for metal prior to magnetic  resonance imaging (MRI).       "  Impression     IMPRESSION: No metallic foreign bodies identified in either orbit.    YANIRA POSEY MD                Thank you for choosing [facility]       Thank you for choosing [facility] for your care. Our goal is always to provide you with excellent care. Hearing back from our patients is one way we can continue to improve our services. Please take a few minutes to complete the written survey that you may receive in the mail after you visit with us. Thank you!        [employer] Information     [employer] lets you send messages to your doctor, view your test results, renew your prescriptions, schedule appointments and more. To sign up, go to [email or web address] . Click on \"Log in\" on the left side of the screen, which will take you to the Welcome page. Then click on \"Sign up Now\" on the right side of the page.     You will be asked to enter the access code listed below, as well as some personal information. Please follow the directions to create your username and password.     Your access code is: [email or web address]  Expires: 3/4/2018  9:17 AM     Your access code will  in 90 days. If you need help or a new code, please call your [facility] clinic or [phone].        Care EveryWhere ID     This is your Care EveryWhere ID. This could be used by other organizations to access your [facility] medical records  [id number]        Equal Access to Services     AURELIO DURON : Hadii nestor daviso Socheo, waaxda luqadaha, qaybta kaalmada adeegyada, luis daniel baxter . So [facility] [phone].    ATENCIÓN: Si habla español, tiene a zapien disposición servicios gratuitos de asistencia lingüística. Llame al [phone].    We comply with applicable federal civil rights laws and [place] laws. We do not discriminate on the basis of race, color, national origin, age, disability, sex, sexual orientation, or gender identity.            After Visit Summary       This is your record. Keep this with you and show " to your community pharmacist(s) and doctor(s) at your next visit.

## 2018-01-16 RX ORDER — SULINDAC 200 MG/1
200 TABLET ORAL 2 TIMES DAILY WITH MEALS
Qty: 20 TABLET | Refills: 0 | Status: SHIPPED | OUTPATIENT
Start: 2018-01-16

## 2018-01-16 ASSESSMENT — ENCOUNTER SYMPTOMS
MYALGIAS: 1
BLOOD IN STOOL: 0
SORE THROAT: 0
SHORTNESS OF BREATH: 0
DYSURIA: 0
SINUS PRESSURE: 0
PALPITATIONS: 0
CONSTIPATION: 0
VOMITING: 0
HEADACHES: 0
ABDOMINAL PAIN: 0
FREQUENCY: 0
NAUSEA: 0
DIAPHORESIS: 0
WHEEZING: 0
DIARRHEA: 0
FEVER: 0
COUGH: 0
CHILLS: 0

## 2018-01-16 NOTE — DISCHARGE INSTRUCTIONS
ICD-10-CM    1. Encounter for imaging to screen for metal prior to magnetic resonance imaging (MRI) Z13.89 UA with Microscopic     XR Eye Foreign Body     XR Eye Foreign Body   2. Rheumatoid arthritis involving multiple sites, unspecified rheumatoid factor presence (H) M06.9 RHEUMATOLOGY REFERRAL   3. Pain of right thigh M79.651 ORTHO  REFERRAL    unclear cause. despite recent injury, no sign of fracture on xray or MRI.  Use crutches, stay off the leg, avoid overuse.  Stay on RA meds including prednisone and leflunemide.  There was a small hip effusion.  At times this needs to undergo arthrocentesis if signs of infection - more likely currently related to RA.  close follow-up is needed.

## 2018-01-16 NOTE — ED PROVIDER NOTES
History     Chief Complaint   Patient presents with     Groin Pain     pt c/o groin pain that radiates down rt leg for past 3 days and pain getting worse,  fell walking due to pain, fell a week ago on rt hip     HPI  Waldemar Weaver is a 57 year old male who presents after fall 1 week ago, fell in yard, slipped on wooden spindles and fell directly onto the rt side onto a metal flashlight.  after a short period was able to stand and walk, with limp.  afterwards could climb steps.  However starting 2 nights ago, could not ambulate on right leg due to pain at right proximal hip/inguinal region. No swelling in leg.   tactile warmth, chills.      today while at store was unable to walk and needed to support self on cart.        no URI, no chest pain, shortness of breath.  No abd pain.    headache.   The patient denies abdominal or flank pain, anorexia, nausea or vomiting, dysphagia, change in bowel habits or black or bloody stools or weight loss.  except for chronic hemorrhoid bleeding s/p colonoscopy negative  The patient denies dysuria, frequency or hematuria.  history of severe rheumatoid arthritis    on prednisone chronically for RA along with Lefleunamide     s/p dexa scan     No symptoms suggestive of  cauda equina syndrome (central spinal stenosis) such as incontinence or retention of urine or stool, inner thigh numbness or foot drop.        Problem List:    Patient Active Problem List    Diagnosis Date Noted     Rheumatoid arthritis involving multiple sites, unspecified rheumatoid factor presence (H) 03/15/2017     Priority: Medium     RA (rheumatoid arthritis) (H) 05/05/2015     Priority: Medium        Past Medical History:    No past medical history on file.    Past Surgical History:    Past Surgical History:   Procedure Laterality Date     COLONOSCOPY  1/20/2014    Procedure: COLONOSCOPY;  Colonoscopy  ;  Surgeon: Young Fischer MD;  Location: [facility]       Family History:    No family history on  file.    Social History:  Marital Status:   [2]  Social History   Substance Use Topics     Smoking status: Never Smoker     Smokeless tobacco: Current User     Types: Snuff      Comment: cutting down on chewing 3/15/2016      Alcohol use No      Comment: 4 tins a week        Medications:      predniSONE (DELTASONE) 5 MG tablet   leflunomide (ARAVA) 20 MG tablet   oxyCODONE IR (ROXICODONE) 10 MG tablet   inFLIXimab (REMICADE) 100 MG injection   EPINEPHrine (EPIPEN 2-PAULINO) 0.3 MG/0.3ML injection 2-pack   Amphetamine-Dextroamphetamine (ADDERALL XR PO)   omeprazole 20 MG tablet         Review of Systems   Constitutional: Negative for chills, diaphoresis and fever.   HENT: Negative for ear pain, sinus pressure and sore throat.    Eyes: Negative for visual disturbance.   Respiratory: Negative for cough, shortness of breath and wheezing.    Cardiovascular: Negative for chest pain and palpitations.   Gastrointestinal: Negative for abdominal pain, blood in stool, constipation, diarrhea, nausea and vomiting.   Genitourinary: Negative for dysuria, frequency and urgency.   Musculoskeletal: Positive for myalgias.   Skin: Negative for rash.   Neurological: Negative for headaches.   All other systems reviewed and are negative.      Physical Exam   BP: 108/69  Pulse: 67  Temp: 99.3  F (37.4  C)  SpO2: 97 %    Physical Exam   Constitutional: He appears distressed.   Eyes: Conjunctivae are normal.   Neck: Neck supple.   Cardiovascular: Exam reveals no gallop and no friction rub.    No murmur heard.  Pulmonary/Chest: Effort normal. No respiratory distress. He has no wheezes. He has no rales.   Abdominal: Soft. Bowel sounds are normal. He exhibits no distension. There is no tenderness. There is no rebound and no guarding. Hernia confirmed negative in the right inguinal area and confirmed negative in the left inguinal area.   Genitourinary: Testes normal. Right testis shows no mass, no swelling and no tenderness. Left testis shows  no mass, no swelling and no tenderness.   Musculoskeletal: He exhibits no edema.        Right hip: He exhibits tenderness. He exhibits normal range of motion, no swelling and no deformity.        Right knee: He exhibits normal range of motion, no swelling, no effusion and no erythema. No tenderness found.        Right ankle: He exhibits normal range of motion, no swelling and no ecchymosis. No tenderness.        Lumbar back: He exhibits tenderness (mild, non-focal tenderness). He exhibits no swelling.        Legs:  Lymphadenopathy:        Right: No inguinal adenopathy present.        Left: No inguinal adenopathy present.   Neurological: He is alert. He exhibits normal muscle tone.   Skin: No rash noted. He is not diaphoretic.     pain on hip range of motion. tenderness to palpation over the lateral thigh.      ED Course     ED Course     Procedures               Critical Care time:  none               Results for orders placed or performed during the hospital encounter of 01/15/18   Femur XR, 2 views, right    Narrative    RIGHT FEMUR TWO VIEWS  1/15/2018 9:14 PM     COMPARISON: None.    HISTORY: Lateral femur pain after fall.      FINDINGS: The visualized bones and joint spaces are within normal  limits.      Impression    IMPRESSION: No evidence for fracture, dislocation or significant  degenerative change of the right femur.     YANIRA POSEY MD   Pelvis XR, 1-2 views    Narrative    PELVIS ONE-TWO VIEWS 1/15/2018 9:14 PM     COMPARISON: None.    HISTORY: Fall, hip pain, right.    FINDINGS: The visualized bones and joint spaces are within normal  limits.      Impression    IMPRESSION: No evidence for fracture, dislocation or significant  degenerative change of the pelvis or right hip on this single frontal  view.    YANIRA POSEY MD   MR Femur Right w/o Contrast    Narrative    MR FEMUR RIGHT WITHOUT CONTRAST January 15, 2018 10:03 PM     HISTORY: Right hip and inguinal pain for seven days after a  fall.  Negative plain film. Evaluate for occult fracture.    TECHNIQUE: Coronal T1 and STIR, axial T1 and T2 fat-suppressed and  sagittal STIR images.    COMPARISON: None.    FINDINGS: Both the right and left thighs are included in the  field-of-view. No acute bony abnormality. Incidental note of a small  synovial herniation pit at the anterior superolateral humeral neck of  no clinical significance. No significant degenerative changes at the  hips. Small right hip joint effusion.    There is some mild increased signal within and surrounding the distal  right gluteus minimus tendon at the greater trochanteric attachment  (image 9 of series 5) consistent with tendinosis and/or bursitis.  Remainder of the gluteal tendon insertions appear normal. Proximal  hamstring origins are normal. Remainder of the soft tissues around the  hips and in the thighs are unremarkable.      Impression    IMPRESSION:  1. No acute bony abnormality.  2. Small right hip joint effusion.  3. Mild right distal gluteus minimus tendinosis/tendinitis and/or  bursitis.     XR Eye Foreign Body    Narrative    EYE FOREIGN BODY  1/15/2018  9:41 PM     COMPARISON: None.    HISTORY: Encounter for imaging to screen for metal prior to magnetic  resonance imaging (MRI).      Impression    IMPRESSION: No metallic foreign bodies identified in either orbit.    YANIRA POSEY MD   UA with Microscopic   Result Value Ref Range    Color Urine Yellow     Appearance Urine Clear     Glucose Urine Negative NEG^Negative mg/dL    Bilirubin Urine Negative NEG^Negative    Ketones Urine Negative NEG^Negative mg/dL    Specific Gravity Urine 1.015 1.003 - 1.035    Blood Urine Negative NEG^Negative    pH Urine 5.5 5.0 - 7.0 pH    Protein Albumin Urine Negative NEG^Negative mg/dL    Urobilinogen mg/dL Normal 0.0 - 2.0 mg/dL    Nitrite Urine Negative NEG^Negative    Leukocyte Esterase Urine Negative NEG^Negative    Source Midstream Urine     WBC Urine 0 0 - 2 /HPF    RBC Urine  1 0 - 2 /HPF    Mucous Urine Present (A) NEG^Negative /LPF         Assessments & Plan (with Medical Decision Making)     MDM: Waldemar Weaver is a 57 year old male who presented with a history of rheumatoid arthritis and a recent fall 1 week ago in which he was able to ambulate afterwards but has since had pain in the lateral thigh.  This is interfered with ambulation and periodic pain.  When his rheumatologist was no longer at this hospital. He has been without his TNF agent since summer   He needs to reestablish with rheumatology.  This may be impacting his current injury.  He is currently afebrile who has some chills but no signs of septic joint and externally no rash or lower extremity edema to suggest alternative diagnoses such as shingles, septic joint, deep vein thrombosis.  Majority of his pain appeared to be at the hip and proximal femur.  Initial x-rays were negative.  He has been on long-term prednisone with osteopenia and therefore given the significant pain in this region an MRI was performed to evaluate for an occult femoral fracture and this was negative.  There was a small amount of fluid within the joint.  Again we discussed close follow-up and if fevers were to develop he would potentially need a tap of that joint but this point I do suspect all of this is post trauma musculoskeletal pain without fracture and recommended crutch use and follow-up in clinic.  Additional recommendations are as below.       I have reviewed the nursing notes.    I have reviewed the findings, diagnosis, plan and need for follow up with the patient.       New Prescriptions    No medications on file       Final diagnoses:   Rheumatoid arthritis involving multiple sites, unspecified rheumatoid factor presence (H)   Pain of right thigh - unclear cause. despite recent injury, no sign of fracture on xray or MRI.  Use crutches, stay off the leg, avoid overuse.  Stay on RA meds including prednisone and leflunemide.  There was a  small hip effusion.  At times this needs to undergo arthrocentesis if signs of infection - more likely currently related to RA.  close follow-up is needed.       1/15/2018   [facility] EMERGENCY DEPARTMENT     Jorge Otto MD  01/16/18 0973

## 2021-05-29 ENCOUNTER — APPOINTMENT (OUTPATIENT)
Dept: GENERAL RADIOLOGY | Facility: CLINIC | Age: 61
End: 2021-05-29
Attending: EMERGENCY MEDICINE
Payer: MEDICARE

## 2021-05-29 ENCOUNTER — HOSPITAL ENCOUNTER (EMERGENCY)
Facility: CLINIC | Age: 61
Discharge: HOME | End: 2021-05-29
Attending: EMERGENCY MEDICINE | Admitting: EMERGENCY MEDICINE
Payer: MEDICARE

## 2021-05-29 VITALS
SYSTOLIC BLOOD PRESSURE: 150 MMHG | RESPIRATION RATE: 16 BRPM | WEIGHT: 175 LBS | TEMPERATURE: 98 F | DIASTOLIC BLOOD PRESSURE: 96 MMHG | HEART RATE: 78 BPM | OXYGEN SATURATION: 98 % | BODY MASS INDEX: 25.84 KG/M2

## 2021-05-29 DIAGNOSIS — M79.642 PAIN OF LEFT HAND: ICD-10-CM

## 2021-05-29 PROCEDURE — 250N000013 HC RX MED GY IP 250 OP 250 PS 637: Performed by: EMERGENCY MEDICINE

## 2021-05-29 PROCEDURE — 99284 EMERGENCY DEPT VISIT MOD MDM: CPT | Performed by: EMERGENCY MEDICINE

## 2021-05-29 PROCEDURE — 99283 EMERGENCY DEPT VISIT LOW MDM: CPT | Performed by: EMERGENCY MEDICINE

## 2021-05-29 PROCEDURE — 73130 X-RAY EXAM OF HAND: CPT | Mod: LT

## 2021-05-29 RX ORDER — OXYCODONE HYDROCHLORIDE 5 MG/1
5 TABLET ORAL EVERY 6 HOURS PRN
Qty: 12 TABLET | Refills: 0 | Status: SHIPPED | OUTPATIENT
Start: 2021-05-29

## 2021-05-29 RX ORDER — OXYCODONE HYDROCHLORIDE 5 MG/1
10 TABLET ORAL ONCE
Status: COMPLETED | OUTPATIENT
Start: 2021-05-29 | End: 2021-05-29

## 2021-05-29 RX ADMIN — OXYCODONE HYDROCHLORIDE 10 MG: 5 TABLET ORAL at 10:48

## 2021-05-29 NOTE — ED PROVIDER NOTES
History     Chief Complaint   Patient presents with     Joint Pain     Pt has RA and is reporting an increase of pain in his L hand.     HPI  Waldemar Weaver is a 60 year old male with a history of rheumatoid arthritis on prednisone daily and receiving injections of abatacept weekly who presents for left hand pain.  Yesterday the patient was fixing a lawnmower, but the belt snapped back and struck him in the left hand.  Since then he has been having worsening pain of the left hand, pins-and-needles, severe, located within digits 2 through 5 diffusely and into the distal metacarpals.  No swelling.  No rash.  No fevers or chills.  He has been taking ibuprofen with minimal improvement.    Allergies:  Allergies   Allergen Reactions     Bees Swelling     Nka [No Known Allergies]        Problem List:    Patient Active Problem List    Diagnosis Date Noted     Rheumatoid arthritis involving multiple sites, unspecified rheumatoid factor presence 03/15/2017     Priority: Medium     IMO Regulatory Load OCT 2020       RA (rheumatoid arthritis) (H) 05/05/2015     Priority: Medium        Past Medical History:    No past medical history on file.    Past Surgical History:    Past Surgical History:   Procedure Laterality Date     COLONOSCOPY  1/20/2014    Procedure: COLONOSCOPY;  Colonoscopy  ;  Surgeon: Young Fischer MD;  Location: [facility]       Family History:    No family history on file.    Social History:  Marital Status:   [2]  Social History     Tobacco Use     Smoking status: Never Smoker     Smokeless tobacco: Current User     Types: Snuff     Tobacco comment: cutting down on chewing 3/15/2016    Substance Use Topics     Alcohol use: No     Comment: 4 tins a week     Drug use: No        Medications:    oxyCODONE (ROXICODONE) 5 MG tablet  Amphetamine-Dextroamphetamine (ADDERALL XR PO)  EPINEPHrine (EPIPEN 2-PAULINO) 0.3 MG/0.3ML injection 2-pack  inFLIXimab (REMICADE) 100 MG injection  leflunomide (ARAVA) 20 MG  tablet  omeprazole 20 MG tablet  predniSONE (DELTASONE) 5 MG tablet  sulindac (CLINORIL) 200 MG tablet          Review of Systems  A 2 point review of systems was performed. All pertinent positives and negatives were listed in the HPI and rest of ROS were otherwise negative.    Physical Exam   BP: (!) 150/96  Pulse: 78  Temp: 98  F (36.7  C)  Resp: 16  Weight: 79.4 kg (175 lb)  SpO2: 98 %      Physical Exam  Constitutional:       General: He is not in acute distress.     Appearance: He is well-developed. He is not diaphoretic.   HENT:      Head: Normocephalic and atraumatic.   Eyes:      General: No scleral icterus.  Neck:      Musculoskeletal: Normal range of motion and neck supple.   Cardiovascular:      Pulses:           Radial pulses are 2+ on the left side.   Musculoskeletal:      Comments: Left Hand: Tenderness to palpation throughout the second through fifth digits without excess warmth, swelling, or erythema.  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   Skin:     General: Skin is warm and dry.      Findings: No rash.   Neurological:      Mental Status: He is alert and oriented to person, place, and time.         ED Course        Procedures               Critical Care time:  none               Results for orders placed or performed during the hospital encounter of 05/29/21 (from the past 24 hour(s))   XR Hand Left G/E 3 Views    Narrative    XR HAND LT G/E 3 VW 5/29/2021 11:05 AM     HISTORY: pain throughout fingers 2-4 and into the distal metacarpals;  direct blow while repairing , no deformity, history of RA    COMPARISON: None.      Impression    IMPRESSION: No fractures are evident. 2 mm radiopaque foreign body in  the distal dorsal aspect of the second metacarpal. Degenerative  changes in the STT, first CMC and multiple IP joints.    ESME SILVER MD       Medications   oxyCODONE (ROXICODONE)  tablet 10 mg (10 mg Oral Given 5/29/21 1041)       Assessments & Plan (with Medical Decision Making)   60-year-old male with a history of rheumatoid arthritis who presents with left hand pain after direct blow while repairing a lawnmower.  Temperature is 36.7  C, heart rate 78, SPO2 is 98% on room air.  No signs of cellulitis or abscess on exam.  No signs of septic arthritis.  He is given oxycodone for the pain.  X-ray of the hand obtained, images reviewed independently as well as radiology read reviewed, no signs of fracture or dislocation.  There is a small foreign body along the second metacarpal, however on inspection there is no open wounds and I think this is likely old and not contributing to his symptoms today.  He is feeling better after the oxycodone and is safe to discharge with instructions to use ice or heat intermittently, continue the ibuprofen, he is given a short course of oxycodone for pain.  He is told return for signs of infection, otherwise follow-up in clinic if not better over the next several days.  The patient is in agreement with this plan.    I have reviewed the nursing notes.    I have reviewed the findings, diagnosis, plan and need for follow up with the patient.       New Prescriptions    OXYCODONE (ROXICODONE) 5 MG TABLET    Take 1 tablet (5 mg) by mouth every 6 hours as needed for moderate to severe pain       Final diagnoses:   Pain of left hand       5/29/2021   [facility] EMERGENCY DEPT     Fred Henriquez MD  05/29/21 4325

## 2021-05-29 NOTE — DISCHARGE INSTRUCTIONS
Use ice or heat for 10 to 15 minutes at a time every 1-2 hours while awake.  Continue your home medications.  Return to the emergency department for increased swelling, redness, fevers, or other concerns.  Follow-up in clinic if not better in the next 4 to 5 days.    Use ibuprofen 400 mg every 6 hours for your symptoms.  Use oxycodone as needed for pain that is not controlled by the prior two medications.    Oxycodone is an addictive opioid medication, please only take it when the pain is more than can be controlled by acetaminophen or ibuprofen alone. It will also make you lightheaded, nauseated, and constipated.  Do not drive, operate heavy machinery, or take care of young children while taking this medication. Do not mix it with other medications or drugs that will make you sleepy, such as alcohol.     Repeated studies have shown that the longer you use opioid pain medications, the longer it is until you return to normal function. It is our recommendation that you taper off opioids as quickly as possible with the goal of returning to normal function or near normal function. Long term use of opioids quickly results in growing tolerance to the medication (less of the benefits) and increased dependence (more of the bad side effects).     Pain is very difficult to treat and we can very rarely take away pain completely. If you are having difficulty with pain over several weeks after an injury, you may need to start different medications and therapies (such as physical therapy, graded exercise, massage, and acupuncture). Please talk about this with your regular doctor.     If you are interested in seeking free, confidential treatment referral and information service for individuals and families facing mental health and/or substance use disorders please call [phone]

## 2021-07-24 ENCOUNTER — HEALTH MAINTENANCE LETTER (OUTPATIENT)
Age: 61
End: 2021-07-24

## 2021-09-18 ENCOUNTER — HEALTH MAINTENANCE LETTER (OUTPATIENT)
Age: 61
End: 2021-09-18

## 2022-02-17 PROBLEM — M06.9 RHEUMATOID ARTHRITIS INVOLVING MULTIPLE SITES (H): Status: ACTIVE | Noted: 2017-03-15

## 2022-08-14 ENCOUNTER — HEALTH MAINTENANCE LETTER (OUTPATIENT)
Age: 62
End: 2022-08-14

## 2022-11-19 ENCOUNTER — HEALTH MAINTENANCE LETTER (OUTPATIENT)
Age: 62
End: 2022-11-19

## 2023-09-10 ENCOUNTER — HEALTH MAINTENANCE LETTER (OUTPATIENT)
Age: 63
End: 2023-09-10